# Patient Record
Sex: MALE | Race: WHITE | Employment: OTHER | URBAN - METROPOLITAN AREA
[De-identification: names, ages, dates, MRNs, and addresses within clinical notes are randomized per-mention and may not be internally consistent; named-entity substitution may affect disease eponyms.]

---

## 2019-04-10 RX ORDER — FUROSEMIDE 40 MG/1
40 TABLET ORAL DAILY
COMMUNITY

## 2019-04-10 RX ORDER — CARVEDILOL 25 MG/1
25 TABLET ORAL 3 TIMES DAILY
COMMUNITY

## 2019-04-10 RX ORDER — M-VIT,TX,IRON,MINS/CALC/FOLIC 27MG-0.4MG
1 TABLET ORAL DAILY
COMMUNITY

## 2019-04-10 RX ORDER — ATORVASTATIN CALCIUM 10 MG/1
10 TABLET, FILM COATED ORAL WEEKLY
COMMUNITY

## 2019-04-10 RX ORDER — HYDRALAZINE HYDROCHLORIDE 50 MG/1
50 TABLET, FILM COATED ORAL 3 TIMES DAILY
COMMUNITY

## 2019-04-10 RX ORDER — LISINOPRIL AND HYDROCHLOROTHIAZIDE 25; 20 MG/1; MG/1
1 TABLET ORAL DAILY
COMMUNITY

## 2019-04-10 RX ORDER — NAPROXEN SODIUM 220 MG
220 TABLET ORAL 2 TIMES DAILY WITH MEALS
COMMUNITY

## 2019-04-11 ENCOUNTER — HOSPITAL ENCOUNTER (OUTPATIENT)
Age: 69
Setting detail: OUTPATIENT SURGERY
Discharge: HOME | End: 2019-04-11
Attending: OPHTHALMOLOGY | Admitting: OPHTHALMOLOGY
Payer: MEDICARE

## 2019-04-11 ENCOUNTER — ANESTHESIA (OUTPATIENT)
Dept: OPERATING ROOM | Age: 69
End: 2019-04-11
Payer: MEDICARE

## 2019-04-11 ENCOUNTER — ANESTHESIA EVENT (OUTPATIENT)
Dept: OPERATING ROOM | Age: 69
End: 2019-04-11
Payer: MEDICARE

## 2019-04-11 VITALS
HEIGHT: 67 IN | TEMPERATURE: 97.7 F | BODY MASS INDEX: 48.65 KG/M2 | WEIGHT: 310 LBS | SYSTOLIC BLOOD PRESSURE: 148 MMHG | HEART RATE: 67 BPM | RESPIRATION RATE: 16 BRPM | OXYGEN SATURATION: 97 % | DIASTOLIC BLOOD PRESSURE: 77 MMHG

## 2019-04-11 VITALS — DIASTOLIC BLOOD PRESSURE: 42 MMHG | TEMPERATURE: 96.8 F | SYSTOLIC BLOOD PRESSURE: 123 MMHG | OXYGEN SATURATION: 97 %

## 2019-04-11 DIAGNOSIS — H33.012 RETINAL DETACHMENT WITH SINGLE BREAK, LEFT EYE: Primary | ICD-10-CM

## 2019-04-11 LAB
EKG ATRIAL RATE: 59 BPM
EKG P AXIS: 51 DEGREES
EKG P-R INTERVAL: 224 MS
EKG Q-T INTERVAL: 404 MS
EKG QRS DURATION: 116 MS
EKG QTC CALCULATION (BAZETT): 399 MS
EKG R AXIS: 31 DEGREES
EKG T AXIS: 17 DEGREES
EKG VENTRICULAR RATE: 59 BPM
GLUCOSE BLD-MCNC: 106 MG/DL (ref 75–110)
GLUCOSE BLD-MCNC: 134 MG/DL (ref 74–100)
POC POTASSIUM: 3.4 MMOL/L (ref 3.5–4.5)
POC SODIUM: 143 MMOL/L (ref 138–146)

## 2019-04-11 PROCEDURE — 6360000002 HC RX W HCPCS: Performed by: NURSE ANESTHETIST, CERTIFIED REGISTERED

## 2019-04-11 PROCEDURE — 84132 ASSAY OF SERUM POTASSIUM: CPT

## 2019-04-11 PROCEDURE — 7100000040 HC SPAR PHASE II RECOVERY - FIRST 15 MIN: Performed by: OPHTHALMOLOGY

## 2019-04-11 PROCEDURE — 2720000010 HC SURG SUPPLY STERILE: Performed by: OPHTHALMOLOGY

## 2019-04-11 PROCEDURE — 6360000002 HC RX W HCPCS: Performed by: OPHTHALMOLOGY

## 2019-04-11 PROCEDURE — 3700000001 HC ADD 15 MINUTES (ANESTHESIA): Performed by: OPHTHALMOLOGY

## 2019-04-11 PROCEDURE — 2709999900 HC NON-CHARGEABLE SUPPLY: Performed by: OPHTHALMOLOGY

## 2019-04-11 PROCEDURE — 2500000003 HC RX 250 WO HCPCS: Performed by: OPHTHALMOLOGY

## 2019-04-11 PROCEDURE — 3700000000 HC ANESTHESIA ATTENDED CARE: Performed by: OPHTHALMOLOGY

## 2019-04-11 PROCEDURE — 82947 ASSAY GLUCOSE BLOOD QUANT: CPT

## 2019-04-11 PROCEDURE — 7100000041 HC SPAR PHASE II RECOVERY - ADDTL 15 MIN: Performed by: OPHTHALMOLOGY

## 2019-04-11 PROCEDURE — 93005 ELECTROCARDIOGRAM TRACING: CPT

## 2019-04-11 PROCEDURE — 3600000014 HC SURGERY LEVEL 4 ADDTL 15MIN: Performed by: OPHTHALMOLOGY

## 2019-04-11 PROCEDURE — 6370000000 HC RX 637 (ALT 250 FOR IP): Performed by: OPHTHALMOLOGY

## 2019-04-11 PROCEDURE — 84295 ASSAY OF SERUM SODIUM: CPT

## 2019-04-11 PROCEDURE — 2580000003 HC RX 258: Performed by: NURSE ANESTHETIST, CERTIFIED REGISTERED

## 2019-04-11 PROCEDURE — 2500000003 HC RX 250 WO HCPCS: Performed by: NURSE ANESTHETIST, CERTIFIED REGISTERED

## 2019-04-11 PROCEDURE — 2580000003 HC RX 258: Performed by: OPHTHALMOLOGY

## 2019-04-11 PROCEDURE — 3600000004 HC SURGERY LEVEL 4 BASE: Performed by: OPHTHALMOLOGY

## 2019-04-11 RX ORDER — LATANOPROST 50 UG/ML
1 SOLUTION/ DROPS OPHTHALMIC DAILY
Qty: 1 BOTTLE | Refills: 3 | Status: SHIPPED | OUTPATIENT
Start: 2019-04-11

## 2019-04-11 RX ORDER — PREDNISOLONE ACETATE 10 MG/ML
1 SUSPENSION/ DROPS OPHTHALMIC
Status: COMPLETED | OUTPATIENT
Start: 2019-04-11 | End: 2019-04-11

## 2019-04-11 RX ORDER — FENTANYL CITRATE 50 UG/ML
25 INJECTION, SOLUTION INTRAMUSCULAR; INTRAVENOUS EVERY 5 MIN PRN
Status: CANCELLED | OUTPATIENT
Start: 2019-04-11

## 2019-04-11 RX ORDER — SODIUM CHLORIDE, SODIUM LACTATE, POTASSIUM CHLORIDE, CALCIUM CHLORIDE 600; 310; 30; 20 MG/100ML; MG/100ML; MG/100ML; MG/100ML
INJECTION, SOLUTION INTRAVENOUS CONTINUOUS PRN
Status: DISCONTINUED | OUTPATIENT
Start: 2019-04-11 | End: 2019-04-11 | Stop reason: SDUPTHER

## 2019-04-11 RX ORDER — LIDOCAINE HYDROCHLORIDE 10 MG/ML
INJECTION, SOLUTION EPIDURAL; INFILTRATION; INTRACAUDAL; PERINEURAL PRN
Status: DISCONTINUED | OUTPATIENT
Start: 2019-04-11 | End: 2019-04-11 | Stop reason: SDUPTHER

## 2019-04-11 RX ORDER — ATROPINE SULFATE 10 MG/ML
1 SOLUTION/ DROPS OPHTHALMIC 2 TIMES DAILY
Qty: 1 BOTTLE | Refills: 0
Start: 2019-04-11

## 2019-04-11 RX ORDER — LIDOCAINE HYDROCHLORIDE 10 MG/ML
1 INJECTION, SOLUTION EPIDURAL; INFILTRATION; INTRACAUDAL; PERINEURAL
Status: CANCELLED | OUTPATIENT
Start: 2019-04-11 | End: 2019-04-11

## 2019-04-11 RX ORDER — SODIUM CHLORIDE 0.9 % (FLUSH) 0.9 %
10 SYRINGE (ML) INJECTION PRN
Status: CANCELLED | OUTPATIENT
Start: 2019-04-11

## 2019-04-11 RX ORDER — LATANOPROST 50 UG/ML
SOLUTION/ DROPS OPHTHALMIC PRN
Status: DISCONTINUED | OUTPATIENT
Start: 2019-04-11 | End: 2019-04-11 | Stop reason: ALTCHOICE

## 2019-04-11 RX ORDER — PHENYLEPHRINE HYDROCHLORIDE 100 MG/ML
1 SOLUTION/ DROPS OPHTHALMIC EVERY 5 MIN PRN
Status: COMPLETED | OUTPATIENT
Start: 2019-04-11 | End: 2019-04-11

## 2019-04-11 RX ORDER — ACETAMINOPHEN AND CODEINE PHOSPHATE 300; 30 MG/1; MG/1
1-2 TABLET ORAL EVERY 6 HOURS PRN
Qty: 15 TABLET | Refills: 0
Start: 2019-04-11 | End: 2019-04-14

## 2019-04-11 RX ORDER — GENTAMICIN SULFATE 40 MG/ML
INJECTION, SOLUTION INTRAMUSCULAR; INTRAVENOUS PRN
Status: DISCONTINUED | OUTPATIENT
Start: 2019-04-11 | End: 2019-04-11 | Stop reason: ALTCHOICE

## 2019-04-11 RX ORDER — MIDAZOLAM HYDROCHLORIDE 1 MG/ML
1 INJECTION INTRAMUSCULAR; INTRAVENOUS EVERY 10 MIN PRN
Status: CANCELLED | OUTPATIENT
Start: 2019-04-11

## 2019-04-11 RX ORDER — PROPOFOL 10 MG/ML
INJECTION, EMULSION INTRAVENOUS PRN
Status: DISCONTINUED | OUTPATIENT
Start: 2019-04-11 | End: 2019-04-11 | Stop reason: SDUPTHER

## 2019-04-11 RX ORDER — SODIUM CHLORIDE 0.9 % (FLUSH) 0.9 %
10 SYRINGE (ML) INJECTION EVERY 12 HOURS SCHEDULED
Status: CANCELLED | OUTPATIENT
Start: 2019-04-11

## 2019-04-11 RX ORDER — DEXAMETHASONE SODIUM PHOSPHATE 10 MG/ML
INJECTION INTRAMUSCULAR; INTRAVENOUS PRN
Status: DISCONTINUED | OUTPATIENT
Start: 2019-04-11 | End: 2019-04-11 | Stop reason: ALTCHOICE

## 2019-04-11 RX ORDER — ERYTHROMYCIN 5 MG/G
OINTMENT OPHTHALMIC PRN
Status: DISCONTINUED | OUTPATIENT
Start: 2019-04-11 | End: 2019-04-11 | Stop reason: ALTCHOICE

## 2019-04-11 RX ORDER — CEFAZOLIN SODIUM 500 MG/2.2ML
INJECTION, POWDER, FOR SOLUTION INTRAMUSCULAR; INTRAVENOUS PRN
Status: DISCONTINUED | OUTPATIENT
Start: 2019-04-11 | End: 2019-04-11 | Stop reason: ALTCHOICE

## 2019-04-11 RX ORDER — DEXTROSE MONOHYDRATE 25 G/50ML
INJECTION, SOLUTION INTRAVENOUS PRN
Status: DISCONTINUED | OUTPATIENT
Start: 2019-04-11 | End: 2019-04-11 | Stop reason: ALTCHOICE

## 2019-04-11 RX ORDER — ATROPINE SULFATE 10 MG/ML
1 SOLUTION/ DROPS OPHTHALMIC
Status: COMPLETED | OUTPATIENT
Start: 2019-04-11 | End: 2019-04-11

## 2019-04-11 RX ORDER — TOBRAMYCIN AND DEXAMETHASONE 3; 1 MG/ML; MG/ML
1 SUSPENSION/ DROPS OPHTHALMIC
Qty: 1 BOTTLE | Refills: 0
Start: 2019-04-11 | End: 2019-04-21

## 2019-04-11 RX ORDER — SODIUM CHLORIDE, SODIUM LACTATE, POTASSIUM CHLORIDE, CALCIUM CHLORIDE 600; 310; 30; 20 MG/100ML; MG/100ML; MG/100ML; MG/100ML
INJECTION, SOLUTION INTRAVENOUS CONTINUOUS
Status: CANCELLED | OUTPATIENT
Start: 2019-04-11

## 2019-04-11 RX ORDER — TOBRAMYCIN AND DEXAMETHASONE 3; 1 MG/ML; MG/ML
SUSPENSION/ DROPS OPHTHALMIC PRN
Status: DISCONTINUED | OUTPATIENT
Start: 2019-04-11 | End: 2019-04-11 | Stop reason: ALTCHOICE

## 2019-04-11 RX ORDER — CYCLOPENTOLATE HYDROCHLORIDE 10 MG/ML
1 SOLUTION/ DROPS OPHTHALMIC EVERY 5 MIN PRN
Status: COMPLETED | OUTPATIENT
Start: 2019-04-11 | End: 2019-04-11

## 2019-04-11 RX ORDER — SODIUM CHLORIDE 0.9 % (FLUSH) 0.9 %
SYRINGE (ML) INJECTION PRN
Status: DISCONTINUED | OUTPATIENT
Start: 2019-04-11 | End: 2019-04-11 | Stop reason: ALTCHOICE

## 2019-04-11 RX ADMIN — CYCLOPENTOLATE HYDROCHLORIDE 1 DROP: 10 SOLUTION/ DROPS OPHTHALMIC at 09:30

## 2019-04-11 RX ADMIN — ATROPINE SULFATE 1 DROP: 10 SOLUTION/ DROPS OPHTHALMIC at 09:20

## 2019-04-11 RX ADMIN — PHENYLEPHRINE HYDROCHLORIDE 1 DROP: 100 SOLUTION/ DROPS OPHTHALMIC at 09:20

## 2019-04-11 RX ADMIN — PHENYLEPHRINE HYDROCHLORIDE 1 DROP: 100 SOLUTION/ DROPS OPHTHALMIC at 09:37

## 2019-04-11 RX ADMIN — PROPOFOL 50 MG: 10 INJECTION, EMULSION INTRAVENOUS at 10:18

## 2019-04-11 RX ADMIN — CYCLOPENTOLATE HYDROCHLORIDE 1 DROP: 10 SOLUTION/ DROPS OPHTHALMIC at 09:37

## 2019-04-11 RX ADMIN — LIDOCAINE HYDROCHLORIDE 50 MG: 10 INJECTION, SOLUTION EPIDURAL; INFILTRATION; INTRACAUDAL; PERINEURAL at 10:17

## 2019-04-11 RX ADMIN — CYCLOPENTOLATE HYDROCHLORIDE 1 DROP: 10 SOLUTION/ DROPS OPHTHALMIC at 09:20

## 2019-04-11 RX ADMIN — PHENYLEPHRINE HYDROCHLORIDE 1 DROP: 100 SOLUTION/ DROPS OPHTHALMIC at 09:30

## 2019-04-11 RX ADMIN — PREDNISOLONE ACETATE 1 DROP: 10 SUSPENSION/ DROPS OPHTHALMIC at 09:20

## 2019-04-11 RX ADMIN — PROPOFOL 50 MG: 10 INJECTION, EMULSION INTRAVENOUS at 10:19

## 2019-04-11 RX ADMIN — SODIUM CHLORIDE, POTASSIUM CHLORIDE, SODIUM LACTATE AND CALCIUM CHLORIDE: 600; 310; 30; 20 INJECTION, SOLUTION INTRAVENOUS at 10:11

## 2019-04-11 RX ADMIN — PROPOFOL 70 MG: 10 INJECTION, EMULSION INTRAVENOUS at 10:17

## 2019-04-11 ASSESSMENT — PULMONARY FUNCTION TESTS
PIF_VALUE: 0
PIF_VALUE: 1
PIF_VALUE: 0
PIF_VALUE: 1
PIF_VALUE: 0
PIF_VALUE: 1
PIF_VALUE: 0

## 2019-04-11 ASSESSMENT — COPD QUESTIONNAIRES: CAT_SEVERITY: MODERATE

## 2019-04-11 ASSESSMENT — PAIN SCALES - GENERAL
PAINLEVEL_OUTOF10: 0

## 2019-04-11 ASSESSMENT — PAIN - FUNCTIONAL ASSESSMENT: PAIN_FUNCTIONAL_ASSESSMENT: 0-10

## 2019-04-11 NOTE — BRIEF OP NOTE
Brief Postoperative Note  ______________________________________________________________    Patient: Lety Martinez  YOB: 1950  MRN: [id number]  Date of Procedure: 4/11/2019    Pre-Op Diagnosis: MAC OFF RETINAL DETACHEMENT LEFT EYE    Post-Op Diagnosis: Same       Procedure(s):  VITRECTOMY 25 GAUGE, AIR FLUID AIR GAS EXCHANGE W/15% C3F8,  ENDOLASER 200 MWATTS, 200 MSECONDS, 894 SPOTS,    Anesthesia: Monitor Anesthesia Care    Surgeon(s):  Matthew Gabriel MD    Assistant: OR tech    Estimated Blood Loss (mL): less than 50     Complications: None    Specimens:   * No specimens in log *    Implants:  * No implants in log *      Drains: * No LDAs found *    Findings: mac off RD    Matthew Gabriel MD  Date: 4/11/2019  Time: 11:42 AM

## 2019-04-11 NOTE — ANESTHESIA PRE PROCEDURE
Laterality Date    COLONOSCOPY      JOINT REPLACEMENT Right 2018    right knee    REFRACTIVE SURGERY Bilateral 2487-1287    TONSILLECTOMY AND ADENOIDECTOMY  approx     uvula removed as well       Social History:    Social History     Tobacco Use    Smoking status: Former Smoker     Types: Cigarettes     Last attempt to quit:      Years since quittin.2    Smokeless tobacco: Never Used   Substance Use Topics    Alcohol use: Not on file     Comment: last drink 20 years ago                                Counseling given: Not Answered      Vital Signs (Current):   Vitals:    04/10/19 1349 19 0856   BP:  (!) 147/83   Pulse:  63   Resp:  18   Temp:  98.1 °F (36.7 °C)   TempSrc:  Bladder   SpO2:  93%   Weight: 300 lb (136.1 kg) (!) 310 lb (140.6 kg)   Height: 5' 7\" (1.702 m) 5' 7\" (1.702 m)                                              BP Readings from Last 3 Encounters:   19 (!) 147/83       NPO Status: Time of last liquid consumption: 1800                        Time of last solid consumption: 2300                        Date of last liquid consumption: 04/10/19                        Date of last solid food consumption: 04/10/19    BMI:   Wt Readings from Last 3 Encounters:   19 (!) 310 lb (140.6 kg)     Body mass index is 48.55 kg/m². CBC: No results found for: WBC, RBC, HGB, HCT, MCV, RDW, PLT    CMP: No results found for: NA, K, CL, CO2, BUN, CREATININE, GFRAA, AGRATIO, LABGLOM, GLUCOSE, PROT, CALCIUM, BILITOT, ALKPHOS, AST, ALT    POC Tests: No results for input(s): POCGLU, POCNA, POCK, POCCL, POCBUN, POCHEMO, POCHCT in the last 72 hours.     Coags: No results found for: PROTIME, INR, APTT    HCG (If Applicable): No results found for: PREGTESTUR, PREGSERUM, HCG, HCGQUANT     ABGs: No results found for: PHART, PO2ART, KSM3ZQR, QCK8OXG, BEART, C7NUYIGU     Type & Screen (If Applicable):  No results found for: LABABO, [address]    Anesthesia Evaluation  Patient summary reviewed no history of anesthetic complications:   Airway: Mallampati: II  TM distance: >3 FB   Neck ROM: full  Mouth opening: > = 3 FB Dental:          Pulmonary:normal exam    (+) COPD: moderate,  sleep apnea: on CPAP,                             Cardiovascular:    (+) hypertension: moderate,       ECG reviewed  Rhythm: regular  Rate: normal                    Neuro/Psych:   Negative Neuro/Psych ROS              GI/Hepatic/Renal:             Endo/Other:    (+) DiabetesType II DM, no interval change, , .                 Abdominal:           Vascular: negative vascular ROS. Anesthesia Plan      MAC     ASA 3       Induction: intravenous. Anesthetic plan and risks discussed with patient. Plan discussed with CRNA.                   Forrest Roe MD   4/11/2019

## 2019-04-11 NOTE — ANESTHESIA POSTPROCEDURE EVALUATION
Department of Anesthesiology  Postprocedure Note    Patient: Kaylin Bhatia  MRN: [id number]  YOB: 1950  Date of evaluation: 4/11/2019  Time:  12:13 PM     Procedure Summary     Date:  04/11/19 Room / Location:  [facility] OR [address] [place] [address] OR    Anesthesia Start:  1011 Anesthesia Stop:  1148    Procedure:  VITRECTOMY 25 GAUGE, AIR FLUID AIR GAS EXCHANGE W/15% C3F8,  ENDOLASER 200 MWATTS, 200 MSECONDS, 894 SPOTS, (Left ) Diagnosis:  (MAC OFF RETINAL DETACHEMENT LEFT EYE)    Surgeon:  Morales Sheridan MD Responsible Provider:      Anesthesia Type:  MAC ASA Status:  3          Anesthesia Type: MAC    Lamar Phase I:      Lamar Phase II:      Last vitals: Reviewed and per EMR flowsheets.        Anesthesia Post Evaluation    Patient location during evaluation: PACU  Patient participation: complete - patient participated  Level of consciousness: awake and alert  Pain score: 0  Airway patency: patent  Nausea & Vomiting: no vomiting and no nausea  Complications: no  Cardiovascular status: hemodynamically stable  Respiratory status: acceptable  Hydration status: stable

## 2019-04-11 NOTE — H&P
History and Physical    Pt Name: Yvonne [place]  MRN: [id number]  YOB: 1950  Date of evaluation: 4/11/2019  Primary Care Physician: No primary care provider on file. Patient evaluated at the request of  Dr. Fritz Hart    Reason for evaluation:   Left eye retinal detachment   SUBJECTIVE:   History of Chief Complaint:      Xochilt Chester is a 79 y.o. male          who presents with a hx of visual  changes that started approx TWO weeks   ago in  His   LEFT  eye ,   gray arc nasally  He reports , onset was sudden, floaters in both eyes x the last 2 years , sudden flash in his left eye intermittently x the last 2 weeks . Denies head trauma           Past Medical History      has a past medical history of Arthritis, COPD (chronic obstructive pulmonary disease) ([address] Diabetes mellitus ([facility] [address] Hyperlipidemia, Hypertension, and Sleep apnea. Past Surgical History   has a past surgical history that includes joint replacement (Right, 2018); Tonsillectomy and adenoidectomy (approx 1988); Colonoscopy; and Refractive surgery (Bilateral, 2956-6578). Medications   Scheduled Meds:  Continuous Infusions:  PRN Meds:. Allergies  has No Known Allergies. Family History    family history includes Cancer in his father and mother; Diabetes in his brother; High Blood Pressure in his father.     Family Status   Relation Name Status    Mother  (Not Specified)    Father  (Not Specified)    Brother  (Not Specified)         Social History  Social History     Socioeconomic History    Marital status: Single     Spouse name: Not on file    Number of children: Not on file    Years of education: Not on file    Highest education level: Not on file   Occupational History    Not on file   Social Needs    Financial resource strain: Not on file    Food insecurity:     Worry: Not on file     Inability: Not on file    Transportation needs:     Medical: Not on file     Non-medical: Not on file   Tobacco Use    Smoking status: Former Smoker     Types: Cigarettes     Last attempt to quit:      Years since quittin.2    Smokeless tobacco: Never Used   Substance and Sexual Activity    Alcohol use: Not on file     Comment: last drink 20 years ago    Drug use: Never    Sexual activity: Not on file   Lifestyle    Physical activity:     Days per week: Not on file     Minutes per session: Not on file    Stress: Not on file   Relationships    Social connections:     Talks on phone: Not on file     Gets together: Not on file     Attends Faith service: Not on file     Active member of club or organization: Not on file     Attends meetings of clubs or organizations: Not on file     Relationship status: Not on file    Intimate partner violence:     Fear of current or ex partner: Not on file     Emotionally abused: Not on file     Physically abused: Not on file     Forced sexual activity: Not on file   Other Topics Concern    Not on file   Social History Narrative    Not on file        Service:No    Occupation:  [employer] over 36 yrs ,     Hobbies:  [employer] rider to [employer]     OBJECTIVE:   VITALS:  height is 5' 7\" (1.702 m) and weight is 300 lb (136.1 kg). CONSTITUTIONAL: Alert and oriented times 3, no acute distress and cooperative to examination. friendly and pleasant , very muscular , very robust build     SKIN: rash No, many tattoos     HEENT: Head is normocephalic, atraumatic. EOMI,      Oral air way :slightly narrow Yes    NECK: neck supple, no lymphadenopathy noted, trachea midline and straight       2+ carotid, no bruit    LUNGS: Chest expands equally bilaterally upon respiration, no accessory muscle used. Ausculation reveals no adventitious breath sounds. CARDIOVASCULAR: \"Heart sounds are normal.  Regular rate and rhythm without murmur,    ABDOMEN: Bowel sounds are present in all four quadrants      GENATALIA:Deferred. NEUROLOGIC: \"CN II-XII are grossly intact.        EXTREMITIES: Pitting edema: No,  Varicose veins: No     Dorsal pedal/posterior tibial pulses palpable: Yes         Strength:  Normal       There is no problem list on file for this patient. IMPRESSIONS:   1. Left eye retinal detachment   2. does not have a problem list on file.     [facility] [place] [facility]  Electronically signed 4/11/2019 at 8:45 AM       Scheduled for: left eye surgery

## 2019-04-12 NOTE — OP NOTE
[facility]                             OPERATIVE REPORT    PATIENT NAME: Priyanka Bolanos                      :         1950  MED REC NO:   [id number]                             ROOM:  ACCOUNT NO:   [de-identified]                           ADMIT DATE:  2019  PROVIDER:     Travis Sosa    DATE OF PROCEDURE:  2019    PREOPERATIVE DIAGNOSIS:  Macula-off rhegmatogenous retinal  detachment, left eye. POSTOPERATIVE DIAGNOSIS:  Macula-off rhegmatogenous retinal  detachment, left eye. PRINCIPAL PROCEDURES:  1.  Vitrectomy, left eye. 2.  Air-fluid exchange, left eye. 3.  Endophotocoagulation, left eye. 4.  Injection of 15% C3F8, left eye. ANESTHESIA:  Local monitored. COMPLICATIONS:  None. INDICATIONS:  The patient is an unfortunate 61-year-old white male  who noticed progressive vision loss in his left eye over the last 2  weeks. On examination, he was found to have some macula-off  rhegmatogenous retinal detachment. These findings were discussed  with the patient and was recommended to him that we proceed with  retinal reattachment surgery in hopes of reattaching his retina and  obtaining some visual improvement. The risks and benefits of the  surgery included loss of vision, loss of the eye, pain, infection,  bleeding, scar tissue formation, recurrent detachment, progressive  cataract formation, increased and decreased intraocular pressure  along with the risks of local monitored anesthesia were discussed  with him at length after which he opted to proceed. OPERATION:  After obtaining informed consent, the patient was taken  to the operating room, where he was prepared for local monitored  anesthesia. After receiving IV sedation, the patient received a  peribulbar block consisting of a mixture of 0.5% Marcaine and 2%  lidocaine.   After the injections, the patient was noted to have  good akinesia and anesthesia. The left eye was then prepped and  draped in a normal ophthalmic fashion. Lid speculum was placed. The operating microscope was moved into position. The site was  marked 3.5 mm posterior to the corneoscleral limbus in the  inferotemporal quadrant. After displacing the conjunctiva, a  25-gauge valved trocar was placed in a beveled fashion. Two  additional trocars were placed, one superotemporally and one  superonasally in a similar fashion. The infusion cannula was  inserted into the inferotemporal trocar, and its intraocular  location was viewed as the infusion was turned on. There was no  obstruction to flow. Next, the ocutome was inserted nasally and  light pipe temporally, and a core vitrectomy followed. The  vitreous was cored off centrally and circumferentially out until  limits of visualization. Next, instruments were exchanged and additional nasal vitreous was  removed. Next, using the ocutome, a fluid-fluid exchange was  performed to decrease the height of the _bullous retinal detachment___. After  this was performed, using the ocutome on shave mode, the vitreous  was shaved back to the vitreous base for 360 degrees. Care was  taken not to cause any additional retinal damage. There was noted  to be one small break superotemporally with questionable  surrounding old demarcation line. Because of this peripheral  location, diathermy was used to make a retinotomy posterior to it. Of note, this was done prior to the fluid-fluid exchange. With the  retina mostly attached, endophotocoagulation was placed in the  attached retina nasally. When this was completed, an air-fluid  exchange was performed and additional endophotocoagulation was  placed at the vitreous base for 360 degrees along with surrounding  the retinal breaks superotemporally and the retinotomy. There was  a small amount of bleeding from the retinotomy which was controlled  with endolaser.   The [employer] number to call  with any questions or concerns. Of note, he was taken to the  recovery room in good condition.         Arlet Okeefe    D: 04/11/2019 12:21:57       T: 04/11/2019 20:32:58      [id number]  Job#: [id number]     Doc#: [id number]    CC:

## 2024-01-26 PROBLEM — G47.30 SLEEP APNEA: Status: ACTIVE | Noted: 2024-01-26

## 2024-01-26 PROBLEM — Z86.79 HISTORY OF CARDIOMYOPATHY: Status: ACTIVE | Noted: 2024-01-26

## 2024-01-26 PROBLEM — C80.1 CANCER (MULTI): Status: ACTIVE | Noted: 2024-01-26

## 2024-01-26 PROBLEM — E11.9 CONTROLLED TYPE 2 DIABETES MELLITUS WITHOUT COMPLICATION, WITHOUT LONG-TERM CURRENT USE OF INSULIN (MULTI): Status: ACTIVE | Noted: 2024-01-26

## 2024-01-26 PROBLEM — I10 ESSENTIAL HYPERTENSION: Status: ACTIVE | Noted: 2024-01-26

## 2024-01-26 RX ORDER — METFORMIN HYDROCHLORIDE 500 MG/1
1 TABLET ORAL 2 TIMES DAILY
COMMUNITY
Start: 2021-12-21

## 2024-01-26 RX ORDER — GLYBURIDE 3 MG/1
1 TABLET ORAL DAILY
COMMUNITY
Start: 2022-01-12

## 2024-01-26 RX ORDER — ALBUTEROL SULFATE 90 UG/1
1 AEROSOL, METERED RESPIRATORY (INHALATION) EVERY 4 HOURS PRN
COMMUNITY

## 2024-01-26 RX ORDER — HYDRALAZINE HYDROCHLORIDE 100 MG/1
1.5 TABLET, FILM COATED ORAL 2 TIMES DAILY
COMMUNITY
Start: 2021-03-31

## 2024-01-26 RX ORDER — LISINOPRIL AND HYDROCHLOROTHIAZIDE 20; 25 MG/1; MG/1
1 TABLET ORAL DAILY
COMMUNITY
Start: 2021-11-22 | End: 2024-04-22 | Stop reason: ALTCHOICE

## 2024-01-26 RX ORDER — CARVEDILOL 25 MG/1
6.25 TABLET ORAL 2 TIMES DAILY
COMMUNITY
Start: 2020-08-21 | End: 2024-04-22 | Stop reason: ALTCHOICE

## 2024-01-26 RX ORDER — OXYCODONE HCL 10 MG/1
10 TABLET, FILM COATED, EXTENDED RELEASE ORAL EVERY 12 HOURS
COMMUNITY

## 2024-01-26 RX ORDER — ASPIRIN 81 MG/1
1 TABLET ORAL DAILY
COMMUNITY
Start: 2021-09-20 | End: 2024-04-22 | Stop reason: ALTCHOICE

## 2024-01-26 RX ORDER — FUROSEMIDE 40 MG/1
1 TABLET ORAL DAILY
COMMUNITY
Start: 2021-01-14

## 2024-01-26 RX ORDER — GABAPENTIN 300 MG/1
300 CAPSULE ORAL NIGHTLY
COMMUNITY
End: 2024-04-22 | Stop reason: ALTCHOICE

## 2024-01-26 RX ORDER — CHOLECALCIFEROL (VITAMIN D3) 125 MCG
1 CAPSULE ORAL 2 TIMES DAILY
COMMUNITY

## 2024-03-06 ENCOUNTER — APPOINTMENT (OUTPATIENT)
Dept: CARDIOLOGY | Facility: CLINIC | Age: 74
End: 2024-03-06
Payer: COMMERCIAL

## 2024-03-15 ENCOUNTER — APPOINTMENT (OUTPATIENT)
Dept: CARDIOLOGY | Facility: CLINIC | Age: 74
End: 2024-03-15
Payer: COMMERCIAL

## 2024-04-22 ENCOUNTER — OFFICE VISIT (OUTPATIENT)
Dept: CARDIOLOGY | Facility: CLINIC | Age: 74
End: 2024-04-22
Payer: COMMERCIAL

## 2024-04-22 VITALS
HEIGHT: 67 IN | DIASTOLIC BLOOD PRESSURE: 80 MMHG | BODY MASS INDEX: 46.77 KG/M2 | WEIGHT: 298 LBS | SYSTOLIC BLOOD PRESSURE: 165 MMHG | HEART RATE: 66 BPM

## 2024-04-22 DIAGNOSIS — I26.99 ACUTE PULMONARY EMBOLISM WITHOUT ACUTE COR PULMONALE, UNSPECIFIED PULMONARY EMBOLISM TYPE (MULTI): ICD-10-CM

## 2024-04-22 DIAGNOSIS — C90.00 MULTIPLE MYELOMA, REMISSION STATUS UNSPECIFIED (MULTI): ICD-10-CM

## 2024-04-22 DIAGNOSIS — E11.9 CONTROLLED TYPE 2 DIABETES MELLITUS WITHOUT COMPLICATION, WITHOUT LONG-TERM CURRENT USE OF INSULIN (MULTI): ICD-10-CM

## 2024-04-22 DIAGNOSIS — G47.33 OBSTRUCTIVE SLEEP APNEA SYNDROME: ICD-10-CM

## 2024-04-22 DIAGNOSIS — I10 ESSENTIAL HYPERTENSION: Primary | ICD-10-CM

## 2024-04-22 DIAGNOSIS — Z86.79 HISTORY OF CARDIOMYOPATHY: ICD-10-CM

## 2024-04-22 DIAGNOSIS — Z87.891 FORMER SMOKER: ICD-10-CM

## 2024-04-22 DIAGNOSIS — C80.1 CANCER (MULTI): ICD-10-CM

## 2024-04-22 DIAGNOSIS — Z79.899 HIGH RISK MEDICATION USE: ICD-10-CM

## 2024-04-22 PROCEDURE — 3077F SYST BP >= 140 MM HG: CPT | Performed by: INTERNAL MEDICINE

## 2024-04-22 PROCEDURE — 3008F BODY MASS INDEX DOCD: CPT | Performed by: INTERNAL MEDICINE

## 2024-04-22 PROCEDURE — 99214 OFFICE O/P EST MOD 30 MIN: CPT | Performed by: INTERNAL MEDICINE

## 2024-04-22 PROCEDURE — 3078F DIAST BP <80 MM HG: CPT | Performed by: INTERNAL MEDICINE

## 2024-04-22 PROCEDURE — 1159F MED LIST DOCD IN RCRD: CPT | Performed by: INTERNAL MEDICINE

## 2024-04-22 RX ORDER — CARVEDILOL 6.25 MG/1
6.25 TABLET ORAL
COMMUNITY
End: 2024-06-03 | Stop reason: DRUGHIGH

## 2024-04-22 RX ORDER — UMECLIDINIUM BROMIDE AND VILANTEROL TRIFENATATE 62.5; 25 UG/1; UG/1
1 POWDER RESPIRATORY (INHALATION) DAILY
COMMUNITY

## 2024-04-22 RX ORDER — AMLODIPINE BESYLATE 5 MG/1
5 TABLET ORAL DAILY
Qty: 90 TABLET | Refills: 3 | Status: SHIPPED | OUTPATIENT
Start: 2024-04-22 | End: 2024-06-03 | Stop reason: ALTCHOICE

## 2024-04-22 RX ORDER — POTASSIUM CHLORIDE 20 MEQ/1
20 TABLET, EXTENDED RELEASE ORAL DAILY
COMMUNITY

## 2024-04-22 RX ORDER — ONDANSETRON 4 MG/1
4 TABLET, FILM COATED ORAL EVERY 8 HOURS PRN
COMMUNITY

## 2024-04-22 RX ORDER — OXYCODONE AND ACETAMINOPHEN 10; 325 MG/1; MG/1
1 TABLET ORAL EVERY 8 HOURS PRN
COMMUNITY

## 2024-04-22 RX ORDER — LISINOPRIL AND HYDROCHLOROTHIAZIDE 12.5; 2 MG/1; MG/1
2 TABLET ORAL DAILY
COMMUNITY

## 2024-04-22 NOTE — PROGRESS NOTES
"Pankaj Salmeron is a 73 y.o. male       Chief Complaint    Follow-up          HPI   Patient is in the office for follow-up for the problems noted below.  He is currently being treated by hematology/oncology for multiple myeloma.  He is hypertensive in the office today.  He is on multiple medications at aggressive dosages.  He is on CPAP machine.  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  He denies any palpitations or chest pain.  He was found recently to have bilateral pulmonary emboli for which she was placed on Eliquis.  He has been on aspirin which will be discontinued.    ASSESSMENT AND PLAN:      1. Hypertension, on multiple medication, currently not under control.  Will add amlodipine 5 mg daily and follow his blood pressure readings  2. Morbid obesity, advice provided to control his weight with low-calorie diet and exercise.  3. Sleep apnea, on CPAP machine. Patient has been compliant with CPAP machine on a nightly basis  4. Diabetes managed by PCP.  5. History of left ventricular systolic dysfunction with normalization of the ejection fraction by echocardiogram August 2022 at 65%  6.  Multiple myeloma being treated by oncology  7.  Recent bilateral pulmonary emboli February 2024 currently on Eliquis.     Melissa García MD, [facility]   Review of Systems   All other systems reviewed and are negative.           Vitals:    04/22/24 1419 04/22/24 1437   BP: 152/72 165/80   BP Location: Left arm Right arm   Patient Position: Sitting Sitting   Pulse: 66    Weight: 135 kg (298 lb)    Height: 1.702 m (5' 7\")         Objective   Physical Exam  Constitutional:       Appearance: Normal appearance.   HENT:      Nose: Nose normal.   Neck:      Vascular: No carotid bruit.   Cardiovascular:      Rate and Rhythm: Normal rate.      Pulses: Normal pulses.      Heart sounds: Normal heart sounds.   Pulmonary:      Effort: Pulmonary effort is normal.   Abdominal:      General: Bowel sounds are normal.      Palpations: " Abdomen is soft.   Musculoskeletal:         General: Normal range of motion.      Cervical back: Normal range of motion.      Right lower leg: No edema.      Left lower leg: No edema.   Skin:     General: Skin is warm and dry.   Neurological:      General: No focal deficit present.      Mental Status: He is alert.   Psychiatric:         Mood and Affect: Mood normal.         Behavior: Behavior normal.         Thought Content: Thought content normal.         Judgment: Judgment normal.         Allergies  Heparin     Current Medications    Current Outpatient Medications:     albuterol 90 mcg/actuation inhaler, Inhale 1 puff every 4 hours if needed for shortness of breath or wheezing., Disp: , Rfl:     apixaban (Eliquis) 5 mg tablet, Take 1 tablet (5 mg) by mouth 2 times a day., Disp: , Rfl:     carvedilol (Coreg) 6.25 mg tablet, Take 1 tablet (6.25 mg) by mouth 2 times a day with meals., Disp: , Rfl:     furosemide (Lasix) 40 mg tablet, Take 1 tablet (40 mg) by mouth once daily., Disp: , Rfl:     glyBURIDE micronized (Glynase) 3 mg tablet, Take 1 tablet (3 mg) by mouth once daily., Disp: , Rfl:     hydrALAZINE (Apresoline) 100 mg tablet, Take 1.5 tablets (150 mg) by mouth 2 times a day., Disp: , Rfl:     lisinopriL-hydrochlorothiazide 20-12.5 mg tablet, Take 2 tablets by mouth once daily., Disp: , Rfl:     metFORMIN (Glucophage) 500 mg tablet, Take 1 tablet (500 mg) by mouth 2 times a day., Disp: , Rfl:     multivitamin with minerals (multivitamin-iron-folic acid) tablet, Take 1 tablet by mouth once daily., Disp: , Rfl:     naproxen sodium (Aleve) 220 mg capsule, Take 1 capsule by mouth 2 times a day., Disp: , Rfl:     ondansetron (Zofran) 4 mg tablet, Take 1 tablet (4 mg) by mouth every 8 hours if needed for nausea or vomiting., Disp: , Rfl:     oxyCODONE ER (OxyCONTIN) 10 mg 12 hr tablet, Take 1 tablet (10 mg) by mouth every 12 hours. Do not crush, chew, or split., Disp: , Rfl:     oxyCODONE-acetaminophen (Percocet)   mg tablet, Take 1 tablet by mouth every 8 hours if needed for severe pain (7 - 10)., Disp: , Rfl:     potassium chloride CR 20 mEq ER tablet, Take 1 tablet (20 mEq) by mouth once daily. Do not crush or chew., Disp: , Rfl:     umeclidinium-vilanteroL (Anoro Ellipta) 62.5-25 mcg/actuation blister with device, Inhale 1 puff once daily., Disp: , Rfl:     amLODIPine (Norvasc) 5 mg tablet, Take 1 tablet (5 mg) by mouth once daily., Disp: 90 tablet, Rfl: 3                     Assessment/Plan   1. Essential hypertension  Follow Up In Cardiology    amLODIPine (Norvasc) 5 mg tablet    Follow Up In Cardiology      2. History of cardiomyopathy        3. Controlled type 2 diabetes mellitus without complication, without long-term current use of insulin (Multi)        4. Obstructive sleep apnea syndrome        5. Cancer (Multi)        6. BMI 45.0-49.9, adult (Multi)        7. Former smoker        8. Acute pulmonary embolism without acute cor pulmonale, unspecified pulmonary embolism type (Multi)        9. High risk medication use        10. Multiple myeloma, remission status unspecified (Multi)                 Scribe Attestation  By signing my name below, IAlissa LPN, Scribe   attest that this documentation has been prepared under the direction and in the presence of Melissa García MD.     Provider Attestation - Scribe documentation    All medical record entries made by the Scribe were at my direction and personally dictated by me. I have reviewed the chart and agree that the record accurately reflects my personal performance of the history, physical exam, discussion and plan.

## 2024-04-22 NOTE — LETTER
April 22, 2024     Jeff Salmeron DO  [address] [facility] [address]    Patient: Tray Salmeron   YOB: 1950   Date of Visit: 4/22/2024       Dear Dr. Jeff Salmeron, :    Thank you for referring Tray Salmeron to me for evaluation. Below are my notes for this consultation.  If you have questions, please do not hesitate to call me. I look forward to following your patient along with you.       Sincerely,     Melissa García MD      CC: No Recipients  ______________________________________________________________________________________    Subjective   Tray Salmeron is a 73 y.o. male       Chief Complaint    Follow-up          HPI   Patient is in the office for follow-up for the problems noted below.  He is currently being treated by hematology/oncology for multiple myeloma.  He is hypertensive in the office today.  He is on multiple medications at aggressive dosages.  He is on CPAP machine.  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  He denies any palpitations or chest pain.  He was found recently to have bilateral pulmonary emboli for which she was placed on Eliquis.  He has been on aspirin which will be discontinued.    ASSESSMENT AND PLAN:      1. Hypertension, on multiple medication, currently not under control.  Will add amlodipine 5 mg daily and follow his blood pressure readings  2. Morbid obesity, advice provided to control his weight with low-calorie diet and exercise.  3. Sleep apnea, on CPAP machine. Patient has been compliant with CPAP machine on a nightly basis  4. Diabetes managed by PCP.  5. History of left ventricular systolic dysfunction with normalization of the ejection fraction by echocardiogram August 2022 at 65%  6.  Multiple myeloma being treated by oncology  7.  Recent bilateral pulmonary emboli February 2024 currently on Eliquis.     Melissa García MD, [facility]   Review of Systems   All other systems reviewed and are negative.    "        Vitals:    04/22/24 1419 04/22/24 1437   BP: 152/72 165/80   BP Location: Left arm Right arm   Patient Position: Sitting Sitting   Pulse: 66    Weight: 135 kg (298 lb)    Height: 1.702 m (5' 7\")         Objective   Physical Exam  Constitutional:       Appearance: Normal appearance.   HENT:      Nose: Nose normal.   Neck:      Vascular: No carotid bruit.   Cardiovascular:      Rate and Rhythm: Normal rate.      Pulses: Normal pulses.      Heart sounds: Normal heart sounds.   Pulmonary:      Effort: Pulmonary effort is normal.   Abdominal:      General: Bowel sounds are normal.      Palpations: Abdomen is soft.   Musculoskeletal:         General: Normal range of motion.      Cervical back: Normal range of motion.      Right lower leg: No edema.      Left lower leg: No edema.   Skin:     General: Skin is warm and dry.   Neurological:      General: No focal deficit present.      Mental Status: He is alert.   Psychiatric:         Mood and Affect: Mood normal.         Behavior: Behavior normal.         Thought Content: Thought content normal.         Judgment: Judgment normal.         Allergies  Heparin     Current Medications    Current Outpatient Medications:   •  albuterol 90 mcg/actuation inhaler, Inhale 1 puff every 4 hours if needed for shortness of breath or wheezing., Disp: , Rfl:   •  apixaban (Eliquis) 5 mg tablet, Take 1 tablet (5 mg) by mouth 2 times a day., Disp: , Rfl:   •  carvedilol (Coreg) 6.25 mg tablet, Take 1 tablet (6.25 mg) by mouth 2 times a day with meals., Disp: , Rfl:   •  furosemide (Lasix) 40 mg tablet, Take 1 tablet (40 mg) by mouth once daily., Disp: , Rfl:   •  glyBURIDE micronized (Glynase) 3 mg tablet, Take 1 tablet (3 mg) by mouth once daily., Disp: , Rfl:   •  hydrALAZINE (Apresoline) 100 mg tablet, Take 1.5 tablets (150 mg) by mouth 2 times a day., Disp: , Rfl:   •  lisinopriL-hydrochlorothiazide 20-12.5 mg tablet, Take 2 tablets by mouth once daily., Disp: , Rfl:   •  metFORMIN " (Glucophage) 500 mg tablet, Take 1 tablet (500 mg) by mouth 2 times a day., Disp: , Rfl:   •  multivitamin with minerals (multivitamin-iron-folic acid) tablet, Take 1 tablet by mouth once daily., Disp: , Rfl:   •  naproxen sodium (Aleve) 220 mg capsule, Take 1 capsule by mouth 2 times a day., Disp: , Rfl:   •  ondansetron (Zofran) 4 mg tablet, Take 1 tablet (4 mg) by mouth every 8 hours if needed for nausea or vomiting., Disp: , Rfl:   •  oxyCODONE ER (OxyCONTIN) 10 mg 12 hr tablet, Take 1 tablet (10 mg) by mouth every 12 hours. Do not crush, chew, or split., Disp: , Rfl:   •  oxyCODONE-acetaminophen (Percocet)  mg tablet, Take 1 tablet by mouth every 8 hours if needed for severe pain (7 - 10)., Disp: , Rfl:   •  potassium chloride CR 20 mEq ER tablet, Take 1 tablet (20 mEq) by mouth once daily. Do not crush or chew., Disp: , Rfl:   •  umeclidinium-vilanteroL (Anoro Ellipta) 62.5-25 mcg/actuation blister with device, Inhale 1 puff once daily., Disp: , Rfl:   •  amLODIPine (Norvasc) 5 mg tablet, Take 1 tablet (5 mg) by mouth once daily., Disp: 90 tablet, Rfl: 3                     Assessment/Plan   1. Essential hypertension  Follow Up In Cardiology    amLODIPine (Norvasc) 5 mg tablet    Follow Up In Cardiology      2. History of cardiomyopathy        3. Controlled type 2 diabetes mellitus without complication, without long-term current use of insulin (Multi)        4. Obstructive sleep apnea syndrome        5. Cancer (Multi)        6. BMI 45.0-49.9, adult (Multi)        7. Former smoker        8. Acute pulmonary embolism without acute cor pulmonale, unspecified pulmonary embolism type (Multi)        9. High risk medication use        10. Multiple myeloma, remission status unspecified (Multi)                 Scribe Attestation  By signing my name below, Alissa PACHECO LPN  , Scribe   attest that this documentation has been prepared under the direction and in the presence of Melissa García MD.     Provider  Attestation - Scribe documentation    All medical record entries made by the Scribe were at my direction and personally dictated by me. I have reviewed the chart and agree that the record accurately reflects my personal performance of the history, physical exam, discussion and plan.

## 2024-04-22 NOTE — PATIENT INSTRUCTIONS
Please bring all medicines, vitamins, and herbal supplements with you when you come to the office.    Prescriptions will not be filled unless you are compliant with your follow up appointments or have a follow up appointment scheduled as per instruction of your physician. Refills should be requested at the time of your visit.     BMI was above normal measurement. Current weight: 135 kg (298 lb)  Weight change since last visit (-) denotes wt loss -3 lbs   Weight loss needed to achieve BMI 25: 138.7 Lbs  Weight loss needed to achieve BMI 30: 106.9 Lbs  Provided instructions on dietary changes  Provided instructions on exercise.

## 2024-05-20 ENCOUNTER — APPOINTMENT (OUTPATIENT)
Dept: CARDIOLOGY | Facility: CLINIC | Age: 74
End: 2024-05-20
Payer: COMMERCIAL

## 2024-06-03 ENCOUNTER — OFFICE VISIT (OUTPATIENT)
Dept: CARDIOLOGY | Facility: CLINIC | Age: 74
End: 2024-06-03
Payer: COMMERCIAL

## 2024-06-03 VITALS
HEIGHT: 67 IN | DIASTOLIC BLOOD PRESSURE: 74 MMHG | WEIGHT: 294 LBS | BODY MASS INDEX: 46.15 KG/M2 | SYSTOLIC BLOOD PRESSURE: 126 MMHG | HEART RATE: 64 BPM

## 2024-06-03 DIAGNOSIS — I10 ESSENTIAL HYPERTENSION: ICD-10-CM

## 2024-06-03 PROCEDURE — 1159F MED LIST DOCD IN RCRD: CPT | Performed by: INTERNAL MEDICINE

## 2024-06-03 PROCEDURE — 3078F DIAST BP <80 MM HG: CPT | Performed by: INTERNAL MEDICINE

## 2024-06-03 PROCEDURE — 3074F SYST BP LT 130 MM HG: CPT | Performed by: INTERNAL MEDICINE

## 2024-06-03 PROCEDURE — 99212 OFFICE O/P EST SF 10 MIN: CPT | Performed by: INTERNAL MEDICINE

## 2024-06-03 PROCEDURE — 3008F BODY MASS INDEX DOCD: CPT | Performed by: INTERNAL MEDICINE

## 2024-06-03 RX ORDER — CARVEDILOL 25 MG/1
25 TABLET ORAL
COMMUNITY

## 2024-06-03 NOTE — PATIENT INSTRUCTIONS
Please bring all medicines, vitamins, and herbal supplements with you when you come to the office.    Prescriptions will not be filled unless you are compliant with your follow up appointments or have a follow up appointment scheduled as per instruction of your physician. Refills should be requested at the time of your visit.     Follow up October 30th

## 2024-06-03 NOTE — PROGRESS NOTES
"Subjective   Tray Salmeron is a 73 y.o. male       Chief Complaint    Follow-up          HPI   Patient is in the office for hypertension management.  Since he was last seen in the office several weeks ago we added amlodipine 5 mg daily, the patient apparently did not start taking that medicine but on his own went and increase his Coreg from 6.25 mg twice daily up to 25 mg twice daily which apparently has done the trick since his pressure and heart rate are currently under control.  I advised patient therefore to stay on the Coreg 25 mg twice daily and do not start amlodipine.  ROS         Vitals:    06/03/24 1528 06/03/24 1532   BP: 134/84 126/74   BP Location: Left arm Right arm   Patient Position: Sitting Sitting   Pulse: 64 64   Weight: 133 kg (294 lb)    Height: 1.702 m (5' 7\")         Objective   Physical Exam    Allergies  Heparin     Current Medications    Current Outpatient Medications:     albuterol 90 mcg/actuation inhaler, Inhale 1 puff every 4 hours if needed for shortness of breath or wheezing., Disp: , Rfl:     apixaban (Eliquis) 5 mg tablet, Take 1 tablet (5 mg) by mouth 2 times a day., Disp: , Rfl:     carvedilol (Coreg) 25 mg tablet, Take 1 tablet (25 mg) by mouth 2 times daily (morning and late afternoon)., Disp: , Rfl:     furosemide (Lasix) 40 mg tablet, Take 1 tablet (40 mg) by mouth once daily., Disp: , Rfl:     glyBURIDE micronized (Glynase) 3 mg tablet, Take 1 tablet (3 mg) by mouth once daily., Disp: , Rfl:     hydrALAZINE (Apresoline) 100 mg tablet, Take 1.5 tablets (150 mg) by mouth 2 times a day., Disp: , Rfl:     lisinopriL-hydrochlorothiazide 20-12.5 mg tablet, Take 2 tablets by mouth once daily., Disp: , Rfl:     metFORMIN (Glucophage) 500 mg tablet, Take 1 tablet (500 mg) by mouth 2 times a day., Disp: , Rfl:     multivitamin with minerals (multivitamin-iron-folic acid) tablet, Take 1 tablet by mouth once daily., Disp: , Rfl:     naproxen sodium (Aleve) 220 mg capsule, Take 1 capsule " by mouth 2 times a day., Disp: , Rfl:     ondansetron (Zofran) 4 mg tablet, Take 1 tablet (4 mg) by mouth every 8 hours if needed for nausea or vomiting., Disp: , Rfl:     oxyCODONE ER (OxyCONTIN) 10 mg 12 hr tablet, Take 1 tablet (10 mg) by mouth every 12 hours. Do not crush, chew, or split., Disp: , Rfl:     oxyCODONE-acetaminophen (Percocet)  mg tablet, Take 1 tablet by mouth every 8 hours if needed for severe pain (7 - 10)., Disp: , Rfl:     potassium chloride CR 20 mEq ER tablet, Take 1 tablet (20 mEq) by mouth once daily. Do not crush or chew., Disp: , Rfl:     umeclidinium-vilanteroL (Anoro Ellipta) 62.5-25 mcg/actuation blister with device, Inhale 1 puff once daily., Disp: , Rfl:                      Assessment/Plan   1. Essential hypertension  Follow Up In Cardiology               Scribe Attestation  By signing my name below, I, Sarah LANDON LPN   , Scribe   attest that this documentation has been prepared under the direction and in the presence of Melissa García MD.     Provider Attestation - Scribe documentation    All medical record entries made by the Scribe were at my direction and personally dictated by me. I have reviewed the chart and agree that the record accurately reflects my personal performance of the history, physical exam, discussion and plan.

## 2024-06-25 DIAGNOSIS — I10 ESSENTIAL HYPERTENSION: ICD-10-CM

## 2024-06-27 RX ORDER — FUROSEMIDE 40 MG/1
40 TABLET ORAL DAILY
Qty: 90 TABLET | Refills: 3 | Status: SHIPPED | OUTPATIENT
Start: 2024-06-27 | End: 2025-06-27

## 2024-08-22 DIAGNOSIS — I10 ESSENTIAL HYPERTENSION: ICD-10-CM

## 2024-08-22 DIAGNOSIS — Z86.79 HISTORY OF CARDIOMYOPATHY: ICD-10-CM

## 2024-08-23 RX ORDER — LISINOPRIL AND HYDROCHLOROTHIAZIDE 12.5; 2 MG/1; MG/1
2 TABLET ORAL DAILY
Qty: 180 TABLET | Refills: 3 | Status: SHIPPED | OUTPATIENT
Start: 2024-08-23 | End: 2025-08-23

## 2024-08-23 RX ORDER — CARVEDILOL 25 MG/1
25 TABLET ORAL
Qty: 180 TABLET | Refills: 3 | Status: SHIPPED | OUTPATIENT
Start: 2024-08-23 | End: 2025-08-23

## 2024-08-23 RX ORDER — FUROSEMIDE 40 MG/1
40 TABLET ORAL DAILY
Qty: 90 TABLET | Refills: 3 | Status: SHIPPED | OUTPATIENT
Start: 2024-08-23 | End: 2025-08-23

## 2024-08-23 RX ORDER — POTASSIUM CHLORIDE 20 MEQ/1
20 TABLET, EXTENDED RELEASE ORAL DAILY
Qty: 90 TABLET | Refills: 3 | Status: SHIPPED | OUTPATIENT
Start: 2024-08-23 | End: 2025-08-23

## 2024-08-23 RX ORDER — HYDRALAZINE HYDROCHLORIDE 100 MG/1
150 TABLET, FILM COATED ORAL 2 TIMES DAILY
Qty: 270 TABLET | Refills: 3 | Status: SHIPPED | OUTPATIENT
Start: 2024-08-23 | End: 2025-08-23

## 2024-10-30 ENCOUNTER — APPOINTMENT (OUTPATIENT)
Dept: CARDIOLOGY | Facility: CLINIC | Age: 74
End: 2024-10-30
Payer: COMMERCIAL

## 2024-11-08 ENCOUNTER — TELEPHONE (OUTPATIENT)
Dept: CARDIOLOGY | Facility: CLINIC | Age: 74
End: 2024-11-08
Payer: COMMERCIAL

## 2024-11-08 NOTE — TELEPHONE ENCOUNTER
11-08-24 phoned pt to r/s appt. pt states he is unable to move around and do not want to r/s.     Pt states to tell Dr. Melissa García MD that he was a great dr and thank you for your service. Pt states the run with Dr. Melissa García MD is done and he is under hospice care.

## (undated) DEVICE — DISCONTINUED USE 435154 INVENTORY EXISTS CSFILTER SYRINGE BL ST SLGS033SS (50/BX)

## (undated) DEVICE — 3 ML SYRINGE LUER-LOCK TIP: Brand: MONOJECT

## (undated) DEVICE — PROBE OPHTH 25GA LSR CVD FLEX NIT TAPR TIP

## (undated) DEVICE — AGENT VISCOELASTIC 1ML 2% HYDROXYPROPYL METHCELL PRELD GLS

## (undated) DEVICE — 60 ML SYRINGE LUER-LOCK TIP: Brand: MONOJECT

## (undated) DEVICE — 25 GA FLEXIBLE TIP LASER PROBE: Brand: ALCON, ENGAUGE

## (undated) DEVICE — SOLUTION IRRIG 1000ML PENTALYTE PLAS POUR BTL TIS U SOL

## (undated) DEVICE — SUTURE PLN GUT SZ 6-0 L18IN ABSRB YELLOWISH TAN L6.5MM 1735G

## (undated) DEVICE — 1 EACH 40411 STERILE DISPOSABLE SUPER VIEW® LENS SET & 1 EACH 40100 STERILE MICROSCOPE DRAPE: Brand: SUPER VIEW® PACK

## (undated) DEVICE — GLOVE ORANGE PI 7 1/2   MSG9075

## (undated) DEVICE — KIT,ANTI FOG,W/SPONGE & FLUID,SOFT PACK: Brand: MEDLINE

## (undated) DEVICE — RETINA PK

## (undated) DEVICE — CAUTERY BPLR 25GA INTOCU W/ HNDPC CRD DISP FOR CX9404

## (undated) DEVICE — SUTURE VCRL SZ 7-0 L12IN ABSRB VLT L6.5MM TG140-8 3/8 CIR J566G

## (undated) DEVICE — STANDARD HYPODERMIC NEEDLE,ALUMINUM HUB: Brand: MONOJECT

## (undated) DEVICE — SYRINGE, LUER LOCK, 10ML: Brand: MEDLINE

## (undated) DEVICE — NEEDLE FLTR 18GA L1.5IN MEM THK5UM BLNT DISP

## (undated) DEVICE — SURGICAL PROCEDURE PACK 25 GA VITRECTOMY

## (undated) DEVICE — 25GA EZPASS SOFT TIP CANNULA BOX OF 5: Brand: VORTEX SURGICAL INC

## (undated) DEVICE — NEEDLE HYPO 30GA L0.5IN BGE POLYPR HUB S STL REG BVL STR

## (undated) DEVICE — OCCLUDER EYE POLYETH HYPOALRG ADH TAPE W/O PINHOLE